# Patient Record
Sex: FEMALE | Race: WHITE
[De-identification: names, ages, dates, MRNs, and addresses within clinical notes are randomized per-mention and may not be internally consistent; named-entity substitution may affect disease eponyms.]

---

## 2018-04-15 ENCOUNTER — HOSPITAL ENCOUNTER (EMERGENCY)
Dept: HOSPITAL 13 - EME | Age: 33
Discharge: HOME | End: 2018-04-15
Payer: COMMERCIAL

## 2018-04-15 VITALS — BODY MASS INDEX: 33.94 KG/M2 | HEIGHT: 67 IN | WEIGHT: 216.27 LBS

## 2018-04-15 VITALS — DIASTOLIC BLOOD PRESSURE: 80 MMHG | SYSTOLIC BLOOD PRESSURE: 130 MMHG

## 2018-04-15 DIAGNOSIS — S82.252A: Primary | ICD-10-CM
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
## 2018-04-15 DIAGNOSIS — S82.452A: ICD-10-CM

## 2018-04-15 LAB
APTT BLD: 27.2 SEC (ref 25–37)
BASOPHILS # BLD AUTO: 12.9 G/DL (ref 11.9–15.5)
CHLORIDE: 108 MEQ/L (ref 99–109)
CREATININE: 0.8 MG/DL (ref 0.6–1.3)
GFR SERPLBLD CREATININE-BSD FMLAMALE: > 59 ML/MIN/
GLUCOSE SERPL-MCNC: 97 MG/DL (ref 70–99)
HCO3 BLD-SCNC: 21 MEQ/L (ref 20–31)
HCT VFR BLD CALC: 37.2 % (ref 36–46)
INTER. NORMALIZED RATIO: 0.9
MCH RBC QN AUTO: 30.4 PG (ref 29–34)
MCV: 87.7 FL (ref 83–99)
MONOCYTES # BLD MANUAL: 34.7 G/DL (ref 30–36)
NRBC (%): 0 /100 WBC (ref 0–0)
PLATELET COUNT: 288 K/UL (ref 156–360)
POTASSIUM SERPL-SCNC: 4.1 MEQ/L (ref 3.7–5.4)
QUANTITATIVE HCG: < 4 MIU/ML
RBC # BLD AUTO: 4.24 M/UL (ref 3.8–5.2)
RBC DIS.WIDTH-CV: 12.5 % (ref 11.8–14.6)
RBC DIS.WIDTH-SD: 39.9 % (ref 39–53)
SODIUM: 140 MEQ/L (ref 136–147)
UREA NITROGEN (BUN): 14 MG/DL (ref 9–23)
WBC # BLD AUTO: 12.8 K/UL (ref 4.1–10.2)

## 2020-11-20 ENCOUNTER — HOSPITAL ENCOUNTER (OUTPATIENT)
Dept: HOSPITAL 76 - DI.WCP | Age: 35
Discharge: HOME | End: 2020-11-20
Attending: PHYSICIAN ASSISTANT
Payer: COMMERCIAL

## 2020-11-20 DIAGNOSIS — M25.572: ICD-10-CM

## 2020-11-20 DIAGNOSIS — M79.605: Primary | ICD-10-CM

## 2020-11-20 NOTE — XRAY REPORT
PROCEDURE:  Ankle 3 View LT

 

INDICATIONS:  LEFT ANKLE PAIN

 

TECHNIQUE:  3 views of the ankle were acquired.  

 

COMPARISON:  X-ray tibia-fibula 1/20/2020

 

FINDINGS:  

 

Bones: Old distal tibia and fibular fractures. Tibial fixation is present. Hardware is intact without
 evidence of hardware fracture or periprosthetic lucency. Alignment is within normal limits. Ankle mo
rtise is normally aligned.  No suspicious bony lesions.  

 

Soft tissues:  No tibiotalar joint effusion.  Achilles tendon appears normal.  

 

IMPRESSION:  Old tibia and fibular fractures with fixation as above.

 

Reviewed by: Rita Frances MD on 11/20/2020 1:59 PM PST

Approved by: Rita Frances MD on 11/20/2020 1:59 PM PST

 

 

Station ID:  [id number]

## 2020-11-20 NOTE — XRAY REPORT
PROCEDURE:  Tib/Fib LT

 

INDICATIONS:  LEFT LEG PAIN

 

TECHNIQUE:  2 views of the tibia and fibula were acquired.  

 

COMPARISON:  X-ray ankle 1/20/2020

 

FINDINGS:  

 

Bones: Old tibia and fibular fractures with tibial fixation. Hardware is intact without evidence of h
ardware fracture or periprosthetic loosening. No suspicious bony lesions.  

 

Soft tissues:  No suspicious soft tissue calcifications or masses.  

 

IMPRESSION:  

Old tibia and fibular fractures with fixation as above.

 

Reviewed by: Rita Frances MD on 11/20/2020 2:00 PM PST

Approved by: Rita Frances MD on 11/20/2020 2:00 PM PST

 

 

Station ID:  [id number]

## 2020-11-27 ENCOUNTER — HOSPITAL ENCOUNTER (OUTPATIENT)
Dept: HOSPITAL 76 - LAB.WCP | Age: 35
Discharge: HOME | End: 2020-11-27
Attending: PHYSICIAN ASSISTANT
Payer: COMMERCIAL

## 2020-11-27 DIAGNOSIS — Z00.00: Primary | ICD-10-CM

## 2020-11-27 LAB
ALBUMIN DIAFP-MCNC: 4 G/DL (ref 3.2–5.5)
ALBUMIN/GLOB SERPL: 1.5 {RATIO} (ref 1–2.2)
ALP SERPL-CCNC: 45 IU/L (ref 42–121)
ALT SERPL W P-5'-P-CCNC: 14 IU/L (ref 10–60)
ANION GAP SERPL CALCULATED.4IONS-SCNC: 5 MMOL/L (ref 6–13)
AST SERPL W P-5'-P-CCNC: 15 IU/L (ref 10–42)
BASOPHILS NFR BLD AUTO: 0 10^3/UL (ref 0–0.1)
BASOPHILS NFR BLD AUTO: 0.4 %
BILIRUB BLD-MCNC: 0.4 MG/DL (ref 0.2–1)
BUN SERPL-MCNC: 12 MG/DL (ref 6–20)
CALCIUM UR-MCNC: 8.9 MG/DL (ref 8.5–10.3)
CHLORIDE SERPL-SCNC: 106 MMOL/L (ref 101–111)
CHOLEST SERPL-MCNC: 175 MG/DL
CO2 SERPL-SCNC: 26 MMOL/L (ref 21–32)
CREAT SERPLBLD-SCNC: 0.8 MG/DL (ref 0.4–1)
EOSINOPHIL # BLD AUTO: 0.1 10^3/UL (ref 0–0.7)
EOSINOPHIL NFR BLD AUTO: 1.9 %
ERYTHROCYTE [DISTWIDTH] IN BLOOD BY AUTOMATED COUNT: 12.5 % (ref 12–15)
EST. AVERAGE GLUCOSE BLD GHB EST-MCNC: 103 MG/DL (ref 70–100)
GFRSERPLBLD MDRD-ARVRAT: 81 ML/MIN/{1.73_M2} (ref 89–?)
GLOBULIN SER-MCNC: 2.7 G/DL (ref 2.1–4.2)
GLUCOSE SERPL-MCNC: 93 MG/DL (ref 70–100)
HBA1C MFR BLD HPLC: 5.2 % (ref 4.27–6.07)
HCT VFR BLD AUTO: 39.1 % (ref 37–47)
HDLC SERPL-MCNC: 45 MG/DL
HDLC SERPL: 3.9 {RATIO} (ref ?–4.4)
HGB UR QL STRIP: 12.8 G/DL (ref 12–16)
LDLC SERPL CALC-MCNC: 112 MG/DL
LDLC/HDLC SERPL: 2.5 {RATIO} (ref ?–4.4)
LYMPHOCYTES # SPEC AUTO: 2 10^3/UL (ref 1.5–3.5)
LYMPHOCYTES NFR BLD AUTO: 42.5 %
MCH RBC QN AUTO: 29.8 PG (ref 27–31)
MCHC RBC AUTO-ENTMCNC: 32.7 G/DL (ref 32–36)
MCV RBC AUTO: 90.9 FL (ref 81–99)
MONOCYTES # BLD AUTO: 0.3 10^3/UL (ref 0–1)
MONOCYTES NFR BLD AUTO: 7 %
NEUTROPHILS # BLD AUTO: 2.3 10^3/UL (ref 1.5–6.6)
NEUTROPHILS # SNV AUTO: 4.7 X10^3/UL (ref 4.8–10.8)
NEUTROPHILS NFR BLD AUTO: 48 %
NRBC # BLD AUTO: 0 /100WBC
NRBC # BLD AUTO: 0 X10^3/UL
PDW BLD AUTO: 10.5 FL (ref 7.9–10.8)
PLATELET # BLD: 259 10^3/UL (ref 130–450)
POTASSIUM SERPL-SCNC: 4 MMOL/L (ref 3.5–5)
PROT SPEC-MCNC: 6.7 G/DL (ref 6.7–8.2)
RBC MAR: 4.3 10^6/UL (ref 4.2–5.4)
SODIUM SERPLBLD-SCNC: 137 MMOL/L (ref 135–145)
TRIGL P FAST SERPL-MCNC: 89 MG/DL
TSH SERPL-ACNC: 1.27 UIU/ML (ref 0.34–5.6)
VLDLC SERPL-SCNC: 18 MG/DL

## 2020-11-27 PROCEDURE — 85025 COMPLETE CBC W/AUTO DIFF WBC: CPT

## 2020-11-27 PROCEDURE — 80061 LIPID PANEL: CPT

## 2020-11-27 PROCEDURE — 83721 ASSAY OF BLOOD LIPOPROTEIN: CPT

## 2020-11-27 PROCEDURE — 83036 HEMOGLOBIN GLYCOSYLATED A1C: CPT

## 2020-11-27 PROCEDURE — 36415 COLL VENOUS BLD VENIPUNCTURE: CPT

## 2020-11-27 PROCEDURE — 84443 ASSAY THYROID STIM HORMONE: CPT

## 2020-11-27 PROCEDURE — 80053 COMPREHEN METABOLIC PANEL: CPT
